# Patient Record
Sex: FEMALE | URBAN - METROPOLITAN AREA
[De-identification: names, ages, dates, MRNs, and addresses within clinical notes are randomized per-mention and may not be internally consistent; named-entity substitution may affect disease eponyms.]

---
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
## 2024-01-23 NOTE — PROCEDURE: MIPS QUALITY
Your laboratory tests and abdominal CT scan appear reassuring other than showing evidence of dehydration.  Although the exact cause of your symptoms remains unclear, gastroparesis is suspected at this time.     Take the dissolvable Zofran ODT as needed for any further episodes of nausea vomiting.  It is recommended that you wait approximate 30 minutes and then take the prescribed Reglan in addition to this to treat the nausea and abdominal pain.  It is also recommended that you take the prescribed famotidine as needed for any upper abdominal pain, especially after you have been vomiting.      Follow-up with your primary care provider for reevaluation within the next few days.  Return back to ED sooner for any worsening abdominal pain, vomiting, fevers, or any other new or concerning symptoms.  
Detail Level: Detailed
Quality 110: Preventive Care And Screening: Influenza Immunization: Influenza Immunization Administered during Influenza season